# Patient Record
Sex: FEMALE | Employment: FULL TIME | URBAN - METROPOLITAN AREA
[De-identification: names, ages, dates, MRNs, and addresses within clinical notes are randomized per-mention and may not be internally consistent; named-entity substitution may affect disease eponyms.]

---

## 2024-11-14 ENCOUNTER — LAB (OUTPATIENT)
Dept: LAB | Facility: LAB | Age: 39
End: 2024-11-14
Payer: COMMERCIAL

## 2024-11-14 ENCOUNTER — APPOINTMENT (OUTPATIENT)
Dept: GENETICS | Facility: CLINIC | Age: 39
End: 2024-11-14
Payer: COMMERCIAL

## 2024-11-14 VITALS
DIASTOLIC BLOOD PRESSURE: 81 MMHG | TEMPERATURE: 98.1 F | SYSTOLIC BLOOD PRESSURE: 119 MMHG | HEART RATE: 96 BPM | BODY MASS INDEX: 48.4 KG/M2 | WEIGHT: 290.5 LBS | HEIGHT: 65 IN

## 2024-11-14 DIAGNOSIS — Z91.89 AT RISK FOR GENETIC DISORDER: Primary | ICD-10-CM

## 2024-11-14 DIAGNOSIS — Z91.89 AT RISK FOR GENETIC DISORDER: ICD-10-CM

## 2024-11-14 PROCEDURE — 1036F TOBACCO NON-USER: CPT | Performed by: INTERNAL MEDICINE

## 2024-11-14 PROCEDURE — 3008F BODY MASS INDEX DOCD: CPT | Performed by: INTERNAL MEDICINE

## 2024-11-14 PROCEDURE — 99203 OFFICE O/P NEW LOW 30 MIN: CPT | Performed by: INTERNAL MEDICINE

## 2024-11-14 PROCEDURE — 9990000009 MISCELLANEOUS GENETICS TEST

## 2024-11-14 RX ORDER — LANOLIN ALCOHOL/MO/W.PET/CERES
1 CREAM (GRAM) TOPICAL DAILY
COMMUNITY
Start: 2024-06-07

## 2024-11-14 RX ORDER — INSULIN GLARGINE 100 [IU]/ML
INJECTION, SOLUTION SUBCUTANEOUS
COMMUNITY
Start: 2024-09-13

## 2024-11-14 RX ORDER — DULOXETIN HYDROCHLORIDE 60 MG/1
60 CAPSULE, DELAYED RELEASE ORAL
COMMUNITY
Start: 2024-10-03

## 2024-11-14 RX ORDER — ALBUTEROL SULFATE 90 UG/1
2 INHALANT RESPIRATORY (INHALATION) EVERY 4 HOURS PRN
COMMUNITY
Start: 2024-04-04

## 2024-11-14 RX ORDER — AMITRIPTYLINE HYDROCHLORIDE 25 MG/1
2 TABLET, FILM COATED ORAL
COMMUNITY

## 2024-11-14 RX ORDER — TIRZEPATIDE 2.5 MG/.5ML
INJECTION, SOLUTION SUBCUTANEOUS
COMMUNITY
Start: 2024-10-12

## 2024-11-14 RX ORDER — PAROXETINE HYDROCHLORIDE 20 MG/1
20 TABLET, FILM COATED ORAL
COMMUNITY

## 2024-11-14 RX ORDER — PROPRANOLOL HYDROCHLORIDE 80 MG/1
80 CAPSULE, EXTENDED RELEASE ORAL
COMMUNITY
Start: 2024-10-03

## 2024-11-14 RX ORDER — FLUTICASONE PROPIONATE 50 MCG
2 SPRAY, SUSPENSION (ML) NASAL
COMMUNITY
Start: 2024-05-10

## 2024-11-14 RX ORDER — HYDROXYZINE HYDROCHLORIDE 50 MG/1
TABLET, FILM COATED ORAL
COMMUNITY
Start: 2024-08-26

## 2024-11-14 RX ORDER — LISINOPRIL 2.5 MG/1
2.5 TABLET ORAL
COMMUNITY
Start: 2024-07-02

## 2024-11-14 RX ORDER — ATORVASTATIN CALCIUM 10 MG/1
10 TABLET, FILM COATED ORAL DAILY
COMMUNITY

## 2024-11-14 RX ORDER — BLOOD-GLUCOSE SENSOR
EACH MISCELLANEOUS
COMMUNITY
Start: 2024-10-17

## 2024-11-14 RX ORDER — TOPIRAMATE 50 MG/1
100 TABLET, FILM COATED ORAL
COMMUNITY
Start: 2024-05-29

## 2024-11-14 RX ORDER — METFORMIN HYDROCHLORIDE 500 MG/1
2 TABLET, EXTENDED RELEASE ORAL 2 TIMES DAILY
COMMUNITY

## 2024-11-14 RX ORDER — BLOOD-GLUCOSE TRANSMITTER
EACH MISCELLANEOUS
COMMUNITY
Start: 2023-11-27

## 2024-11-14 RX ORDER — ONDANSETRON 4 MG/1
TABLET, ORALLY DISINTEGRATING ORAL
COMMUNITY
Start: 2024-10-25

## 2024-11-14 RX ORDER — MELOXICAM 7.5 MG/1
1 TABLET ORAL
COMMUNITY
Start: 2024-09-23

## 2024-11-14 RX ORDER — RIMEGEPANT SULFATE 75 MG/75MG
TABLET, ORALLY DISINTEGRATING ORAL
COMMUNITY

## 2024-11-14 RX ORDER — INSULIN ASPART 100 [IU]/ML
INJECTION, SOLUTION INTRAVENOUS; SUBCUTANEOUS
COMMUNITY
Start: 2024-05-29

## 2024-11-14 RX ORDER — GLIPIZIDE 5 MG/1
1 TABLET ORAL DAILY
COMMUNITY
Start: 2023-02-13

## 2024-11-14 ASSESSMENT — PATIENT HEALTH QUESTIONNAIRE - PHQ9
2. FEELING DOWN, DEPRESSED OR HOPELESS: NOT AT ALL
1. LITTLE INTEREST OR PLEASURE IN DOING THINGS: NOT AT ALL
SUM OF ALL RESPONSES TO PHQ9 QUESTIONS 1 AND 2: 0

## 2024-11-14 NOTE — LETTER
11/14/24    Ernesto Baez PA-C  [address] [facility] [address]      Dear Dr. Ernesto Baze PA-C,    Thank you for referring your patient, Abimbola Loo, to receive care in my office. I have enclosed a summary of the care provided to Abimbola on 11/14/24.    Please contact me with any questions you may have regarding the visit.    Sincerely,         Dodie Deluca MD  [address] [facility] [address]  [facility] [address]    CC: No Recipients

## 2024-11-14 NOTE — PROGRESS NOTES
Genetics Department  [address] [place], [place] [id number]  P: [phone]  F: [phone]      Subjective:   Reason for Visit:   Abimbola is a 38 y.o. female who presents to Genetics clinic for an evaluation due to a family history of Pcka-Kjsf-Ohgv syndrome (BHDS). Abimbola is being seen in consultation at the request of Dr. Baez. The information for this visit was obtained from the patient and the medical records.    History of Present Illness: 38 year old female with a past medical history of T2DM, elevated liver enzymes, CHEYANNE and obesity who is undergoing preoperative management for bariatric surgery.      She has had an upper respiratory infection for a few days, and has been taking amoxicillin prescribed by urgent care. She has a runny nose and sore throat, strep swab was negative. Also having pain in her ears.     She says she often has upper respiratory infections. Mother was diagnosed due a collapsed lung and had confirmatory genetic testing showing FLCN heterozygous pathogenic variant showing c.233del ([id number]). This testing also showed an absence of the familial PMP22 variant commonly associated with CMT - this means our patient is not at risk for inheriting this variant. She says there is other family history (detailed below), and that she wanted FLCN testing as part of her preoperative workup for bariatric surgery which could take place early next year.     Regarding other symptoms, left foot worse numbness/tingling than R, going on for two years. Worse with activity. Had EMG done a year ago due to these symptoms and was not found to have any CMT findings.    She mentions that she had bumps on her skin around her eyes which were surgically removed. She says they looked similar to skin findings on her mother.    Does endorse some abdominal discomfort and diarrhea/constipation since starting mounjaro. Chest pain only with cough, and no radiation. Otherwise symptoms per HPI.  "    Past Medical History:  Abimbola  has no past medical history on file.    Past Surgical History:  Abimbola  has no past surgical history on file.  Tonsillectomy  Sinus surgery  Cysts and polyps removed on ovaries    Pregnancy and  History: No birth history on file.   She has been pregnant 3 times with 3 miscarriages ~15 years ago, says a cause was identified at the time but she is not sure of the cause at this time.    Social History:  Lives with  and has two dogs    Sleep History: Uses CPAP and uses it every night. This helps with her sleep.     Family History:  A multigenerational family history was obtained as part of the visit and pertinent positives and negatives are reviewed here.  The complete pedigree will be scanned into the patient EHR.     Abimbola has one full brother age 41 with bipolar d/o and addiction, she has one 19 year old niece who is a/w.    Maternal: [place] Ethnicity  Mother is age 68 with BHDS and FLCN variant dscribed above, also with COPD and emphysema. 63 year old aunt with CMT, 60 year old uncle a/w, aunt with CMT and daughter with stage 4 lymphoma. Another aunt with BHDS with an affected 39 year old daughter. There is one other 65 year old aunt who is a/w. Maternal grandmother  age 70s had CMT, emphysema and COPD, maternal grandfather  in his 70s, had BHDS, emphysema, and COPD.    Paternal: [place] Ethnicity  Father is age 67 with T2DM, anxiety, and history of heart attacks  One 63 year old uncle with \"heart issues\", one 37 year old paternal first cousin  age 37 of COVID, other cousins are a/w. Paternal grandfather  age 52 of heart attack, and paternal grandmother  age 74 of a heart attack.    The remainder of the history is negative for congenital anomalies, intellectual disability,and known genetic diseases.  Consanguinity is denied.    Review of Systems:  A full review of systems was reviewed with the family.  Pertinent positives listed in the HPI.  All " other systems negative.    MEDICATIONS:   No current outpatient medications on file.    ALLERGIES:   Abimbola has no allergies on file.  Objective:     Physical exam:  Constitutional: No acute distress, patient comfortable appearing  HEENT: NCAT, rhinorrhea  Respiratory: Lungs CTAB, occasional cough  Cardiovascular: Normal rate, regular rhythm, no murmurs appreciated  Gastrointestinal: Soft,, non-distended, +BS  Musculoskeletal: No joint swelling, no deformity, moves all 4 extremities  Integumentary: Intact, warm, dry no rashes, no characteristic rash or skin findings  Neurological: alert, no focal deficits, MAEx4    Assessment and Plan:   Abimbola is a 38 y.o. female with a past medical history of T2DM, elevated liver enzymes, CHEYANNE and obesity who is undergoing preoperative management for bariatric surgery. She has a known family history of BHDS in her mother, with a confirmed FLCN variant.     BHDS is an autosomal dominant condition associated with skin changes including fibrofolliculomas, trichodiscomas, and acrochordons (skin tags). A hallmark of the disease is also spontaneous pneumothorax related to cystic pulmonary disease There is also an increased susceptibility to developing renal cell carcinoma - features of disease are usually apparent by a person's 20s. In the presence of a family history of spontaneous pneumothorax with lung cysts as well as features in [place] that are consistent with this disease, it is reasonable to send genetic testing for BHDS. We discussed that a positive result could result in increased screening for renal cell cancer.    So far Abimbola has not personally demonstrated features consistent with BHDS, and the noted nodules in the lung were not read as cystic, which is what is expected in BHDS. She has additionally not had a collapsed lung, and it is unclear if she shows the characteristic skin changes. That being said, she has a 50% chance of inheriting the pathogenic FLCN variant from her  "mother, and different affected individuals will have variable manifestations of disease. As such, it is reasonable to send for family variant testing through [facility], allowing us to confirm if she has inherited her family's specific variant or not.    Plan:  - Draw blood today for targeted [facility] FLCN family variant testing   - Results in 3-4 weeks   - Folow up with Dr. Deluca on 12/5 at 1 PM    We reviewed that “genetic discrimination\" is one possible risk of genetic testing, especially for individuals without a prior diagnosis of cancer. This is the possibility that insurance companies or employers could use genetic information to increase premiums, discontinue coverage, or affect employability. Federal ENOC (Genetic Information Nondiscrimination Act) legislation prohibits insurers in both the group and individual health insurance markets from requiring genetic testing or using genetic information to determine eligibility or establish premiums.  It also provides some protection against employment discrimination. ENOC does not apply to the [place]  or the Federal Employees Health Benefits program, but these plans have their own protections. Life, disability, and long-term care insurance have no protection from discrimination on the federal level or in many states.      If new questions or concerns arise an appointment can be made by calling [phone].     All results will be discussed with the patient/family at the scheduled follow-up appointment unless other arrangements are made at the time of the visit.      The information we discussed is what is known as of this date. With the rapid pace of medical and genetic research, new discoveries may modify our assessment and approach to this patient and/or family in the future.     Thank you for involving us with the care of Abimbola.    Jose Dietrich MD, PGY-4 Internal Medicine/Medical Genetics  Supervised by Dr. Yosi FARIA, Medical Geneticist    "

## 2024-11-26 LAB
COMMENTS - MP RESULT TYPE: NORMAL
SCAN RESULT: NORMAL

## 2024-12-05 ENCOUNTER — APPOINTMENT (OUTPATIENT)
Dept: GENETICS | Facility: CLINIC | Age: 39
End: 2024-12-05
Payer: COMMERCIAL

## 2025-06-03 ENCOUNTER — TELEPHONE (OUTPATIENT)
Dept: GENETICS | Facility: CLINIC | Age: 40
End: 2025-06-03
Payer: COMMERCIAL

## 2025-06-03 NOTE — TELEPHONE ENCOUNTER
Genetics note:    Testing is negative. Patient did not show up to the follow-up appointment. She was informed during the initial visit that posttest genetic counseling is recommended so that the implications of negative results to her and family members can be discussed.     Dodie Deluca MD  Medical Geneticist